# Patient Record
Sex: FEMALE | Race: ASIAN | NOT HISPANIC OR LATINO | Employment: STUDENT | URBAN - METROPOLITAN AREA
[De-identification: names, ages, dates, MRNs, and addresses within clinical notes are randomized per-mention and may not be internally consistent; named-entity substitution may affect disease eponyms.]

---

## 2020-03-17 ENCOUNTER — COMMUNICATION - HEALTHEAST (OUTPATIENT)
Dept: FAMILY MEDICINE | Facility: CLINIC | Age: 20
End: 2020-03-17

## 2020-08-14 ENCOUNTER — OFFICE VISIT - HEALTHEAST (OUTPATIENT)
Dept: FAMILY MEDICINE | Facility: CLINIC | Age: 20
End: 2020-08-14

## 2020-08-14 DIAGNOSIS — L70.0 ACNE VULGARIS: ICD-10-CM

## 2020-08-14 DIAGNOSIS — Z11.3 SCREEN FOR STD (SEXUALLY TRANSMITTED DISEASE): ICD-10-CM

## 2020-08-14 ASSESSMENT — MIFFLIN-ST. JEOR: SCORE: 1306.79

## 2020-08-18 LAB
C TRACH DNA SPEC QL PROBE+SIG AMP: NEGATIVE
N GONORRHOEA DNA SPEC QL NAA+PROBE: NEGATIVE

## 2020-08-25 ENCOUNTER — COMMUNICATION - HEALTHEAST (OUTPATIENT)
Dept: FAMILY MEDICINE | Facility: CLINIC | Age: 20
End: 2020-08-25

## 2020-09-08 ENCOUNTER — OFFICE VISIT - HEALTHEAST (OUTPATIENT)
Dept: FAMILY MEDICINE | Facility: CLINIC | Age: 20
End: 2020-09-08

## 2020-09-08 DIAGNOSIS — L70.0 ACNE VULGARIS: ICD-10-CM

## 2020-09-08 RX ORDER — CLINDAMYCIN PHOSPHATE 10 MG/G
GEL TOPICAL
Qty: 60 G | Refills: 0 | Status: SHIPPED | OUTPATIENT
Start: 2020-09-08 | End: 2023-10-17

## 2020-09-10 ENCOUNTER — COMMUNICATION - HEALTHEAST (OUTPATIENT)
Dept: FAMILY MEDICINE | Facility: CLINIC | Age: 20
End: 2020-09-10

## 2020-09-10 DIAGNOSIS — L70.0 ACNE VULGARIS: ICD-10-CM

## 2021-04-22 ENCOUNTER — COMMUNICATION - HEALTHEAST (OUTPATIENT)
Dept: FAMILY MEDICINE | Facility: CLINIC | Age: 21
End: 2021-04-22

## 2021-04-28 ENCOUNTER — COMMUNICATION - HEALTHEAST (OUTPATIENT)
Dept: FAMILY MEDICINE | Facility: CLINIC | Age: 21
End: 2021-04-28

## 2021-05-27 ENCOUNTER — OFFICE VISIT - HEALTHEAST (OUTPATIENT)
Dept: FAMILY MEDICINE | Facility: CLINIC | Age: 21
End: 2021-05-27

## 2021-05-27 DIAGNOSIS — L70.0 ACNE VULGARIS: ICD-10-CM

## 2021-05-27 DIAGNOSIS — Z11.3 SCREEN FOR STD (SEXUALLY TRANSMITTED DISEASE): ICD-10-CM

## 2021-05-27 DIAGNOSIS — Z12.4 SCREENING FOR MALIGNANT NEOPLASM OF CERVIX: ICD-10-CM

## 2021-05-27 LAB
CLUE CELLS: NORMAL
TRICHOMONAS, WET PREP: NORMAL
YEAST, WET PREP: NORMAL

## 2021-05-27 RX ORDER — TRETINOIN 0.5 MG/G
CREAM TOPICAL DAILY
Qty: 45 G | Refills: 3 | Status: SHIPPED | OUTPATIENT
Start: 2021-05-27 | End: 2023-10-17 | Stop reason: SINTOL

## 2021-05-27 RX ORDER — SPIRONOLACTONE 25 MG/1
25 TABLET ORAL DAILY
Qty: 90 TABLET | Refills: 3 | Status: SHIPPED | OUTPATIENT
Start: 2021-05-27 | End: 2023-10-17

## 2021-05-27 ASSESSMENT — MIFFLIN-ST. JEOR: SCORE: 1345.35

## 2021-06-04 VITALS
DIASTOLIC BLOOD PRESSURE: 76 MMHG | SYSTOLIC BLOOD PRESSURE: 114 MMHG | HEART RATE: 88 BPM | HEIGHT: 62 IN | WEIGHT: 131.5 LBS | BODY MASS INDEX: 24.2 KG/M2 | OXYGEN SATURATION: 98 % | TEMPERATURE: 99.6 F

## 2021-06-10 NOTE — PROGRESS NOTES
FEMALE PREVENTATIVE EXAM    Assessment and Plan:     Annual physical.  Patient has not been to a doctor in a while, one summer in [employer] but not sure where she was receiving care.  Immunizations are up-to-date, not due for Tdap until 2025.  She is due for Pap smear within the year, she says she will make the appointment.  Recommended she establish care with a dentist, has not seen one in quite some time. Never smoked, never drank, no other drugs.     Screen for STD (sexually transmitted disease)  Currently sexually active, has a Nexplanon in place, had a done at [facility] in April 2020.  Asymptomatic, okay with screen for STDs today  - Chlamydia trachomatis & Neisseria gonorrhoeae, Amplified Detection    Acne vulgaris  Acne on her face and on her back, started after having her Nexplanon placed.  Will treat with tretinoin topically and trial Spironolactone.  If she has any episodes of dizziness, cut dose in half.  Follow-up in 1 month to discuss effectiveness.  If she continues on this medication, will do labs to monitor liver and kidney function.  - tretinoin (RETIN-A) 0.05 % cream; Apply topically daily.  Dispense: 45 g; Refill: 0  - spironolactone (ALDACTONE) 25 MG tablet; Take 1 tablet (25 mg total) by mouth daily.  Dispense: 30 tablet; Refill: 0     Next follow up:  Return in about 3 weeks (around 9/4/2020) for Acne.    Immunization Review  Adult Imm Review: No immunizations due today    I discussed the following with the patient:   Adult Healthy Living: Importance of regular exercise  Getting adequate sleep  Stress management      Subjective:   Chief Complaint: Eric Jesus is an 19 y.o. female here for a preventative health visit.     HPI:   Healthy 19-year-old female here for a physical and to establish care.  She is up-to-date on her immunizations, not due for Tdap until 2025.  She is not yet 20, not due for Pap smear.  She is currently sexually active, has a Nexplanon in place since April 2020.  She  has never been tested for STDs, okay with screening today.  Deferred HIV testing.  She works in medical assembly.    Acne.  Patient has been struggling with acne since she had her Nexplanon placed.  We discussed options for her acne, including OCPs but she already has a Nexplanon in place and does not want to take daily birth control/hormone pill on top of her Nexplanon.  Other options are topical, including benzyl peroxide (she is using some over-the-counter), tretinoin.  She also has acne on her butt, has been causing her some distress.  Other option would be spironolactone, her blood pressure will tolerate it.      She takes no chronic medications  Past medical history.  No known chronic medical issues  Surgical history.  No history of surgeries  Family history.  No known family history of diabetes, hypertension, hyperlipidemia, stroke, heart attack        Healthy Habits  Are you taking a daily aspirin? No  Do you typically exercising at least 40 min, 3-4 times per week?  NO  Do you usually eat at least 4 servings of fruit and vegetables a day, include whole grains and fiber and avoid regularly eating high fat foods? NO  Have you had an eye exam in the past two years? NO  Do you see a dentist twice per year? NO  Do you have any concerns regarding sleep? No    Safety Screen  If you own firearms, are they secured in a locked gun cabinet or with trigger locks? The patient does not own any firearms  Do you feel you are safe where you are living?: Yes (8/14/2020  3:17 PM)  Do you feel you are safe in your relationship(s)?: Yes (8/14/2020  3:17 PM)      Review of Systems:  Please see above.  The rest of the review of systems are negative for all systems.     Pap History:   No - under age 21 PAP not appropriate for age  Cancer Screening     Patient has no health maintenance due at this time          Patient Care Team:  Provider, No Primary Care as PCP - General        History     Reviewed By Date/Time Sections Reviewed  "   Dianna Wu, LPN 8/14/2020  3:21 PM Tobacco, Alcohol, Drug Use, Sexual Activity            Objective:   Vital Signs:   Visit Vitals  /76 (Patient Site: Left Arm, Patient Position: Sitting, Cuff Size: Adult Regular)   Pulse 88   Temp 99.6  F (37.6  C) (Oral)   Ht 5' 1.5\" (1.562 m)   Wt 131 lb 8 oz (59.6 kg)   LMP  (LMP Unknown) Comment: No menses since implant inserted 4/2020   SpO2 98%   BMI 24.44 kg/m           PHYSICAL EXAM  General: Alert and oriented, in NAD.  Eyes: PERRL, EOMI, sclera normal.  HENT: Normocephalic, no pharyngeal erythema, MMM.  Neck: Supple, no adenopathy.  Heart: Normal S1 and S2, regular rhythm. No murmurs, gallops, rubs.  Lungs: CTA bilaterally, no wheezes, no crackles, no rhonchi.  Abdomen: Soft, non-tender, non-distended, BS+.   MSK: Normal ROM of upper and lower extremities.  Neuro: Alert and oriented x 3. Moves all extremities. No focal deficits noted.  Extremities: Peripheral pulses intact, no peripheral edema.  Skin: Warm and well perfused.  Pustules on her forehead, buttocks in various stages of healing.  Psych: Normal mood and affect.        Additional Screenings Completed Today:   See assessment and plan      ======================================================      Options for treatment and follow-up care were reviewed with the patient. Eric Jesus and/or guardian was engaged and actively involved in the decision making process. Eric Jesus and/or guardian verbalized understanding of the options discussed and was satisfied with the final plan.    Don Botello MD  "

## 2021-06-11 NOTE — PROGRESS NOTES
"Eric Jesus is a 19 y.o. female who is being evaluated via a billable telephone visit.      Diagnoses and all orders for this visit:    Acne vulgaris  No side effects on spironolactone, but not as much improvement on her face as desired. Will add clindagel, but follow up if improvement stalls.   -     clindamycin (CLINDAGEL) 1 % gel; Use on face nightly  -     spironolactone (ALDACTONE) 25 MG tablet; Take 1 tablet (25 mg total) by mouth daily.      Follow up 1 month if no improvement.   In office visit to check BP and labs before increasing spironolactone.     =============================================    The patient has been notified of following:     \"This telephone visit will be conducted via a call between you and your physician/provider. We have found that certain health care needs can be provided without the need for a physical exam.  This service lets us provide the care you need with a short phone conversation.  If a prescription is necessary we can send it directly to your pharmacy.  If lab work is needed we can place an order for that and you can then stop by our lab to have the test done at a later time.    Telephone visits are billed at different rates depending on your insurance coverage. During this emergency period, for some insurers they may be billed the same as an in-person visit.  Please reach out to your insurance provider with any questions.    If during the course of the call the physician/provider feels a telephone visit is not appropriate, you will not be charged for this service.\"    Patient has given verbal consent to a Telephone visit? Yes    Patient would like to receive their AVS by AVS Preference: Blairt.    Additional provider notes:     Following up on acne.   The acne on her body has significantly improved.   The acne on her face did slightly improve, but has reached a plateau.   Currently on spironolactone 25mg without side effects, tretinoin.   Discussed options for increasing " spironolactone, but she has no way of checking BP.       =================================  Phone call start time: 3:20PM  Phone call end time: 3:28PM  Phone call duration:  8 minutes    Don Botello MD

## 2021-06-11 NOTE — TELEPHONE ENCOUNTER
RN cannot approve Refill Request    RN can NOT refill this medication Protocol failed and NO refill given. Last office visit: Visit date not found Last Physical: 8/14/2020 Last MTM visit: Visit date not found Last visit same specialty: Visit date not found.  Next visit within 3 mo: Visit date not found  Next physical within 3 mo: Visit date not found      Angie Puentes, [facility] Triage/Med Refill 9/11/2020    Requested Prescriptions   Pending Prescriptions Disp Refills     spironolactone (ALDACTONE) 25 MG tablet [Pharmacy Med Name: SPIRONOLACTONE 25MG TABLETS] 30 tablet 0     Sig: TAKE 1 TABLET(25 MG) BY MOUTH DAILY       Diuretics/Combination Diuretics Refill Protocol  Failed - 9/10/2020  3:24 AM        Failed - Serum Potassium in past 12 months      No results found for: LN-POTASSIUM          Failed - Serum Sodium in past 12 months      No results found for: LN-SODIUM          Failed - Serum Creatinine in past 12 months      No results found for: CREATININE          Passed - Visit with PCP or prescribing provider visit in past 12 months     Last office visit with prescriber/PCP: Visit date not found OR same dept: Visit date not found OR same specialty: Visit date not found  Last physical: 8/14/2020 Last MTM visit: Visit date not found   Next visit within 3 mo: Visit date not found  Next physical within 3 mo: Visit date not found  Prescriber OR PCP: Don Botello MD  Last diagnosis associated with med order: 1. Acne vulgaris  - spironolactone (ALDACTONE) 25 MG tablet [Pharmacy Med Name: SPIRONOLACTONE 25MG TABLETS]; TAKE 1 TABLET(25 MG) BY MOUTH DAILY  Dispense: 30 tablet; Refill: 0    If protocol passes may refill for 12 months if within 3 months of last provider visit (or a total of 15 months).             Passed - Blood pressure on file in past 12 months     BP Readings from Last 1 Encounters:   08/14/20 114/76

## 2021-06-16 PROBLEM — L70.0 ACNE VULGARIS: Status: ACTIVE | Noted: 2020-09-08

## 2021-06-17 NOTE — TELEPHONE ENCOUNTER
Unable to reach patient. She is due for a pap smear. If she calls back please let her know she is due for a cervical cancer screening and assist with scheduling. Thanks

## 2021-06-25 NOTE — PROGRESS NOTES
"Eric Jesus is a 20 y.o. female here for pap only    ASSESSMENT/PLAN:   Eric was seen today for gynecologic exam.    Diagnoses and all orders for this visit:    Screening for malignant neoplasm of cervix  -     Gynecologic Cytology (PAP Smear)    Acne vulgaris  -     spironolactone (ALDACTONE) 25 MG tablet; Take 1 tablet (25 mg total) by mouth daily.  -     tretinoin (RETIN-A) 0.05 % cream; Apply topically daily.    Screen for STD (sexually transmitted disease)  -     Wet Prep, Vaginal          Return in about 1 year (around 5/27/2022) for Annual physical.       ======================================================    SUBJECTIVE  Eric Jesus is a 20 y.o. female here for pap, no other concerns.     Wants to continue on her spironolactone and tretinoin for acne, seeing significant improvement.   Is sexually active, has no concerns for stds.   Tested negative for chlamydia less than a year ago.   Has only had one partner.     ROS  Complete 10 point review of systems negative except as noted above in HPI    Reviewed Past Medical History, Medications, Family History and Social History in Epic and up to date with no new changes.    OBJECTIVE  /62   Pulse 77   Temp 98  F (36.7  C) (Oral)   Resp 16   Ht 5' 1.5\" (1.562 m)   Wt 140 lb (63.5 kg)   LMP  (LMP Unknown)   SpO2 99%   Breastfeeding No   BMI 26.02 kg/m       General: Cooperative, pleasant, in no acute distress  CV: RRR, normal S1/S2, no murmur, rubs, gallops  Resp: No respiratory distress. Clear to auscultation bilaterally. No wheezes, rales, rhonchi  Abd: Nontender, nondistended, bowel sounds present   (female): External genitalia is without lesions. Introitus is normal, vaginal walls pink and moist without lesions or evidence of trauma. No cervical motion tenderness. No adnexal masses. No cervical lesions or discharge  Ext: radial/pedal pulses +2 bilaterally  MSK: Normal muscle tone  Skin: warm, well perfused. No rashes  Psych: No suicidal or homicidal " ideations, no self-harm.  Normal affect.    LABS & IMAGES   Results for orders placed or performed in visit on 05/27/21   Wet Prep, Vaginal    Specimen: Genital   Result Value Ref Range    Yeast Result No yeast seen No yeast seen    Trichomonas No Trichomonas seen No Trichomonas seen    Clue Cells, Wet Prep No Clue cells seen No Clue cells seen   Gynecologic Cytology (PAP Smear)   Result Value Ref Range    Case Report       Gynecologic Cytology Report                       Case: [id number]                                   Authorizing Provider:  Don Botello MD         Collected:           05/27/2021 1541              Ordering Location:     [facility]   Received:            05/27/2021 1541                                     [place]                                                                     First Screen:          Michele Simpson, CT                                                                           (ASCP)                                                                       Specimen:    SUREPATH PAP, SCREENING, Endocervical/cervical                                             Interpretation  Negative for squamous intraepithelial lesion or malignancy.      Negative for squamous intraepithelial lesion or malignancy    Result Flag Normal Normal    Specimen Adequacy       Satisfactory for evaluation, endocervical/transformation zone component present  Lack of clinical history, LMP not given    HPV Reflex? Yes if Abnormal     HIGH RISK No     LMP/Menopause Date unsure     Abnormal Bleeding No     Pt Status n/a     Birth Control/Hormones None     Previous Normal/Date none     Prev Abn Date/Dx none     Cervical Appearance normal          ======================================================  Options for treatment and follow-up care were reviewed with the patient. Eric Jesus and/or guardian was engaged and actively involved in the decision making process. Eric Jesus and/or guardian  verbalized understanding of the options discussed and was satisfied with the final plan.      Don Botello MD

## 2021-06-27 ENCOUNTER — HEALTH MAINTENANCE LETTER (OUTPATIENT)
Age: 21
End: 2021-06-27

## 2021-07-06 VITALS
SYSTOLIC BLOOD PRESSURE: 110 MMHG | HEIGHT: 62 IN | BODY MASS INDEX: 25.76 KG/M2 | RESPIRATION RATE: 16 BRPM | TEMPERATURE: 98 F | HEART RATE: 77 BPM | WEIGHT: 140 LBS | DIASTOLIC BLOOD PRESSURE: 62 MMHG | OXYGEN SATURATION: 99 %

## 2021-10-17 ENCOUNTER — HEALTH MAINTENANCE LETTER (OUTPATIENT)
Age: 21
End: 2021-10-17

## 2022-10-02 ENCOUNTER — HEALTH MAINTENANCE LETTER (OUTPATIENT)
Age: 22
End: 2022-10-02

## 2023-02-11 ENCOUNTER — HEALTH MAINTENANCE LETTER (OUTPATIENT)
Age: 23
End: 2023-02-11

## 2023-10-16 ASSESSMENT — ENCOUNTER SYMPTOMS
FREQUENCY: 0
NAUSEA: 0
FEVER: 0
HEARTBURN: 0
EYE PAIN: 0
PALPITATIONS: 0
BREAST MASS: 0
JOINT SWELLING: 0
HEMATURIA: 0
DYSURIA: 0
PARESTHESIAS: 0
COUGH: 0
MYALGIAS: 0
DIZZINESS: 0
SORE THROAT: 0
NERVOUS/ANXIOUS: 0
CONSTIPATION: 0
ABDOMINAL PAIN: 0
SHORTNESS OF BREATH: 0
ARTHRALGIAS: 0
WEAKNESS: 0
HEMATOCHEZIA: 0
CHILLS: 0
HEADACHES: 0
DIARRHEA: 0

## 2023-10-17 ENCOUNTER — OFFICE VISIT (OUTPATIENT)
Dept: FAMILY MEDICINE | Facility: CLINIC | Age: 23
End: 2023-10-17
Payer: COMMERCIAL

## 2023-10-17 ENCOUNTER — ANCILLARY PROCEDURE (OUTPATIENT)
Dept: GENERAL RADIOLOGY | Facility: CLINIC | Age: 23
End: 2023-10-17
Attending: FAMILY MEDICINE
Payer: COMMERCIAL

## 2023-10-17 VITALS
HEIGHT: 62 IN | TEMPERATURE: 97.7 F | WEIGHT: 138.8 LBS | DIASTOLIC BLOOD PRESSURE: 62 MMHG | OXYGEN SATURATION: 99 % | RESPIRATION RATE: 12 BRPM | SYSTOLIC BLOOD PRESSURE: 100 MMHG | HEART RATE: 70 BPM | BODY MASS INDEX: 25.54 KG/M2

## 2023-10-17 DIAGNOSIS — Z13.220 SCREENING FOR LIPID DISORDERS: ICD-10-CM

## 2023-10-17 DIAGNOSIS — Z13.1 SCREENING FOR DIABETES MELLITUS: ICD-10-CM

## 2023-10-17 DIAGNOSIS — M25.532 LEFT WRIST PAIN: ICD-10-CM

## 2023-10-17 DIAGNOSIS — L70.0 ACNE VULGARIS: ICD-10-CM

## 2023-10-17 DIAGNOSIS — Z11.3 SCREENING FOR STDS (SEXUALLY TRANSMITTED DISEASES): ICD-10-CM

## 2023-10-17 DIAGNOSIS — B35.4 TINEA CORPORIS: ICD-10-CM

## 2023-10-17 DIAGNOSIS — Z23 ENCOUNTER FOR IMMUNIZATION: ICD-10-CM

## 2023-10-17 DIAGNOSIS — E66.3 OVERWEIGHT (BMI 25.0-29.9): ICD-10-CM

## 2023-10-17 DIAGNOSIS — Z00.00 ROUTINE GENERAL MEDICAL EXAMINATION AT A HEALTH CARE FACILITY: Primary | ICD-10-CM

## 2023-10-17 LAB
CHOLEST SERPL-MCNC: 154 MG/DL
FASTING STATUS PATIENT QL REPORTED: YES
GLUCOSE SERPL-MCNC: 105 MG/DL (ref 70–99)
HDLC SERPL-MCNC: 64 MG/DL
LDLC SERPL CALC-MCNC: 79 MG/DL
NONHDLC SERPL-MCNC: 90 MG/DL
TRIGL SERPL-MCNC: 57 MG/DL

## 2023-10-17 PROCEDURE — 36415 COLL VENOUS BLD VENIPUNCTURE: CPT | Performed by: FAMILY MEDICINE

## 2023-10-17 PROCEDURE — 82947 ASSAY GLUCOSE BLOOD QUANT: CPT | Performed by: FAMILY MEDICINE

## 2023-10-17 PROCEDURE — 99395 PREV VISIT EST AGE 18-39: CPT | Mod: 25 | Performed by: FAMILY MEDICINE

## 2023-10-17 PROCEDURE — 90480 ADMN SARSCOV2 VAC 1/ONLY CMP: CPT | Performed by: FAMILY MEDICINE

## 2023-10-17 PROCEDURE — 99214 OFFICE O/P EST MOD 30 MIN: CPT | Mod: 25 | Performed by: FAMILY MEDICINE

## 2023-10-17 PROCEDURE — 73110 X-RAY EXAM OF WRIST: CPT | Mod: TC | Performed by: RADIOLOGY

## 2023-10-17 PROCEDURE — 80061 LIPID PANEL: CPT | Performed by: FAMILY MEDICINE

## 2023-10-17 PROCEDURE — 90471 IMMUNIZATION ADMIN: CPT | Performed by: FAMILY MEDICINE

## 2023-10-17 PROCEDURE — 87591 N.GONORRHOEAE DNA AMP PROB: CPT | Performed by: FAMILY MEDICINE

## 2023-10-17 PROCEDURE — 87491 CHLMYD TRACH DNA AMP PROBE: CPT | Performed by: FAMILY MEDICINE

## 2023-10-17 PROCEDURE — 90686 IIV4 VACC NO PRSV 0.5 ML IM: CPT | Performed by: FAMILY MEDICINE

## 2023-10-17 PROCEDURE — 91320 SARSCV2 VAC 30MCG TRS-SUC IM: CPT | Performed by: FAMILY MEDICINE

## 2023-10-17 RX ORDER — TRETINOIN 0.025 %
CREAM (GRAM) TOPICAL AT BEDTIME
Qty: 60 G | Refills: 3 | Status: SHIPPED | OUTPATIENT
Start: 2023-10-17

## 2023-10-17 RX ORDER — CLOTRIMAZOLE 1 %
CREAM (GRAM) TOPICAL 2 TIMES DAILY
Qty: 45 G | Refills: 0 | Status: SHIPPED | OUTPATIENT
Start: 2023-10-17

## 2023-10-17 ASSESSMENT — ENCOUNTER SYMPTOMS
CHILLS: 0
NERVOUS/ANXIOUS: 0
NAUSEA: 0
HEADACHES: 0
ABDOMINAL PAIN: 0
HEMATOCHEZIA: 0
DYSURIA: 0
HEMATURIA: 0
PALPITATIONS: 0
DIARRHEA: 0
COUGH: 0
JOINT SWELLING: 0
FEVER: 0
EYE PAIN: 0
SORE THROAT: 0
BREAST MASS: 0
HEARTBURN: 0
FREQUENCY: 0
MYALGIAS: 0
CONSTIPATION: 0
PARESTHESIAS: 0
WEAKNESS: 0
ARTHRALGIAS: 0
SHORTNESS OF BREATH: 0
DIZZINESS: 0

## 2023-10-17 NOTE — PATIENT INSTRUCTIONS
"For acne - can buy adapalene (differin) over the counter. Start using retinoid cream every other day or every third day, apply at nighttime.  \"Non-comedogenic\" sunscreen or face lotions are okay    [facility]  Preventive Health Recommendations  Female Ages 21 to 25     Yearly exam:   See your health care provider every year in order to  Review health changes.   Discuss preventive care.    Review your medicines if your doctor has prescribed any.    You should be tested each year for STDs (sexually transmitted diseases).     Talk to your provider about how often you should have cholesterol testing.    Get a Pap test every three years. If you have an abnormal result, your doctor may have you test more often.    If you are at risk for diabetes, you should have a diabetes test (fasting glucose).     Shots:   Get a flu shot each year.   Get a tetanus shot every 10 years.   Consider getting the shot (vaccine) that prevents cervical cancer (Gardasil).    Nutrition:   Eat at least 5 servings of fruits and vegetables each day.  Eat whole-grain bread, whole-wheat pasta and brown rice instead of white grains and rice.  Get adequate Calcium and Vitamin D.     Lifestyle  Exercise at least 150 minutes a week each week (30 minutes a day, 5 days a week). This will help you control your weight and prevent disease.  Limit alcohol to one drink per day.  No smoking.   Wear sunscreen to prevent skin cancer.  See your dentist every six months for an exam and cleaning.  "

## 2023-10-17 NOTE — PROGRESS NOTES
SUBJECTIVE:   CC: Eric is an 22 year old who presents for preventive health visit.       10/17/2023     8:28 AM   Additional Questions   Roomed by coleen pierre       Healthy Habits:     Getting at least 3 servings of Calcium per day:  Yes    Bi-annual eye exam:  NO    Dental care twice a year:  NO    Sleep apnea or symptoms of sleep apnea:  None    Diet:  Regular (no restrictions)    Frequency of exercise:  2-3 days/week    Duration of exercise:  15-30 minutes    Taking medications regularly:  Yes    Medication side effects:  Not applicable    Additional concerns today:  Yes    In school for psychology, [employer], was thinking research psychologist, exploring options; finishes this semester    Lives with partner and a few of his siblings    Nexplanon 4/2020  - past two years didn't have period, then got one about 2 weeks ago    Wrist pain  - used to work in medical assembly, 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 (For example, a Health Directive, POLST, or a discussion with a medical provider or your loved ones about your wishes): No, advance care planning information given to patient to review.  Patient plans to discuss their wishes with loved ones or provider.      Social History     Tobacco Use    Smoking status: Never     Passive exposure: Never    Smokeless tobacco: Never    Tobacco comments:     no passive exposure   Substance Use Topics    Alcohol use: Never             10/16/2023     5:31 PM   Alcohol Use   Prescreen: >3 drinks/day or >7 drinks/week? Not  "Applicable     Reviewed orders with patient.  Reviewed health maintenance and updated orders accordingly - No      Breast Cancer Screening:  No family history of breast cancer      History of abnormal Pap smear: NO - age 21-29 PAP every 3 years recommended      Latest Ref Rng & Units 5/27/2021     3:41 PM   PAP / HPV   PAP Negative for squamous intraepithelial lesion or malignancy. Negative for squamous intraepithelial lesion or malignancy  Electronically signed by Michele Simpson CT (ASCP) on 5/28/2021 at  2:18 PM        Reviewed and updated as needed this visit by clinical staff   Tobacco  Allergies  Meds  Problems  Med Hx  Surg Hx  Fam Hx          Reviewed and updated as needed this visit by Provider   Tobacco  Allergies  Meds  Problems  Med Hx  Surg Hx  Fam Hx             Review of Systems   Constitutional:  Negative for chills and fever.   HENT:  Negative for congestion, ear pain, hearing loss and sore throat.    Eyes:  Negative for pain and visual disturbance.   Respiratory:  Negative for cough and shortness of breath.    Cardiovascular:  Negative for chest pain, palpitations and peripheral edema.   Gastrointestinal:  Negative for abdominal pain, constipation, diarrhea, heartburn, hematochezia and nausea.   Breasts:  Negative for tenderness, breast mass and discharge.   Genitourinary:  Negative for dysuria, frequency, genital sores, hematuria, pelvic pain, urgency, vaginal bleeding and vaginal discharge.   Musculoskeletal:  Negative for arthralgias, joint swelling and myalgias.   Skin:  Negative for rash.   Neurological:  Negative for dizziness, weakness, headaches and paresthesias.   Psychiatric/Behavioral:  Negative for mood changes. The patient is not nervous/anxious.           OBJECTIVE:   /62   Pulse 70   Temp 97.7  F (36.5  C) (Oral)   Resp 12   Ht 1.579 m (5' 2.17\")   Wt 63 kg (138 lb 12.8 oz)   LMP 09/24/2023 (Exact Date)   SpO2 99%   BMI 25.25 kg/m    Physical " Exam  GENERAL: healthy, alert and no distress  EYES: Eyes grossly normal to inspection, PERRL and conjunctivae and sclerae normal  HENT: ear canals and TM's normal, nose and mouth without ulcers or lesions  NECK: no adenopathy, no asymmetry, masses, or scars and thyroid normal to palpation  RESP: lungs clear to auscultation - no rales, rhonchi or wheezes  BREAST: normal without masses, tenderness or nipple discharge and no palpable axillary masses or adenopathy, on lateral left breast there is a mildly hyperpigmented patch with fine scale  CV: regular rate and rhythm, normal S1 S2, no S3 or S4, no murmur, click or rub, no peripheral edema and peripheral pulses strong  ABDOMEN: soft, nontender, no hepatosplenomegaly, no masses and bowel sounds normal  MS: no gross musculoskeletal defects noted, no edema  SKIN: scattered pink papules on bilateral cheeks, no scarring, no other suspicious lesions or rashes  NEURO: Normal strength and tone, mentation intact and speech normal  PSYCH: mentation appears normal, affect normal/bright        ASSESSMENT/PLAN:   (Z00.00) Routine general medical examination at a health care facility  (primary encounter diagnosis)  Comment: Routine physical exam today with several acute health concerns.  Plan: Labs, screenings, and vaccines as ordered and counseling as detailed below.    (L70.0) Acne vulgaris  Comment: Mild, limited to face. Had dryness with higher strength retinoid and clindamycin gel previously. Will trial new regimen. Advised to use retin-A every other night or every third night and build up to decrease irritation. Also if unable to get retin-A through prescription, can buy adapalene over the counter.  Plan: tretinoin (RETIN-A) 0.025 % external cream,         benzoyl peroxide 5 % external liquid            (B35.4) Tinea corporis  Comment: Left breast discoloration is due to tinea corporis. Otherwise breast exam is normal without any masses, other skin changes, or nipple  ciera.  Plan: clotrimazole (LOTRIMIN) 1 % external cream        If no improvement with clotrimazole, return to care for re-evaluation.    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 Differential includes ECU tendinopathy.  Plan: XR Wrist Left G/E 3 Views, Occupational Therapy        Referral  - xray today  - NSAIDs prn pain  - use immobilizing wrist splint when doing activities that require lots of bending or rotating wrist  - start occupational therapy  - can consider referral to orthopedics if above does not help    (Z68.25) BMI 25.0-25.9,adult  Comment: Given BMI and race, recommend screening for lipid disorders and diabetes  Plan: Lipid panel reflex to direct LDL Non-fasting,         Glucose    (E66.3) Overweight (BMI 25.0-29.9)          (Z13.220) Screening for lipid disorders  Comment: see above  Plan: Lipid panel reflex to direct LDL Non-fasting            (Z13.1) Screening for diabetes mellitus  Comment: see above  Plan: Glucose            (Z11.3) Screening for STDs (sexually transmitted diseases)  Comment: Asymptomatic, Agrees to screening.  Plan: NEISSERIA GONORRHOEA PCR, CHLAMYDIA TRACHOMATIS        PCR            (Z23) Encounter for immunization  Comment:   Plan: INFLUENZA VACCINE IM > 6 MONTHS VALENT IIV4         (AFLURIA/FLUZONE), COVID-19 12+ ()         (PFIZER)            Patient has been advised of split billing requirements and indicates understanding: Yes      COUNSELING:  Reviewed preventive health counseling, as reflected in patient instructions  Special attention given to:        Regular exercise       Healthy diet/nutrition       Alcohol Use       Contraception       Family planning       Safe sex practices/STD prevention       Consider Hep C screening for all patients one time for ages 18-79 years       HIV screeninx in teen years, 1x in adult years, and at intervals if high risk        She reports that  "she has never smoked. She has never been exposed to tobacco smoke. She has never used smokeless tobacco.          Evette Garcia MD  [facility]      ADDENDUM: Diagnosis \"Overweight (BMI 25-29.9)\" added per  request for clarification.  "

## 2023-10-18 LAB
C TRACH DNA SPEC QL NAA+PROBE: NEGATIVE
N GONORRHOEA DNA SPEC QL NAA+PROBE: NEGATIVE

## 2023-10-24 DIAGNOSIS — R73.03 PREDIABETES: Primary | ICD-10-CM

## 2023-10-31 DIAGNOSIS — R73.03 PREDIABETES: Primary | ICD-10-CM

## 2023-11-29 NOTE — PROGRESS NOTES
OCCUPATIONAL THERAPY EVALUATION  Type of Visit: Evaluation    See electronic medical record for Abuse and Falls Screening details.    Subjective      Presenting condition or subjective complaint: Left wrist pain  Date of onset: 10/17/23 (Referral)    Relevant medical history:     Dates & types of surgery:      Patient comes to therapy today for formal evaluation of left, ulnar-sided wrist pain.  Until several months ago she worked in [employer], where on a repetitive basis she would lift and manipulate heavy boxes onto a table.  While working there developed this pain that is worsened with axial loading of the wrist, otherwise pain-free.  She is no longer working, has noticed slight improvement in pain since then.  Has trialed a wrist brace at daytime with minimal improvement.    Prior diagnostic imaging/testing results: X-ray     Prior therapy history for the same diagnosis, illness or injury: No      Prior Level of Function  Transfers: Independent  Ambulation: Independent  ADL: Independent  IADL: Driving, Finances, Housekeeping, Laundry, Meal preparation, Medication management, Work, Yard work    Living Environment  Social support: With a significant other or spouse   Type of home: House   Stairs to enter the home: Yes 1 Is there a railing: No   Ramp: No   Stairs inside the home: Yes 10 Is there a railing: Yes   Help at home:    Equipment owned:       Employment: No Currently unemployed.  Hobbies/Interests: [employer].    Patient goals for therapy: Move my wrist fully without pain.       Objective   ADDITIONAL HISTORY:  Right hand dominant  Patient reports symptoms of pain, stiffness/loss of motion, weakness/loss of strength, and edema  Transportation: drives  Currently not working.     Functional Outcome Measure:   Upper Extremity Functional Index Score:  SCORE:   Column Totals: /80: 72   (A lower score indicates greater disability.)    PAIN:  Pain Level at Rest: 0/10  Pain Level with Use:  "5/10  Pain Location: Ulnar wrist, TFCC, ulnar styloid  Pain Quality: Aching  Pain Frequency: intermittent  Pain is Worst: daytime  Pain is Exacerbated By: \"certain wrist movements,\" axial wrist loading  Pain is Relieved By: rest and repositioning, \"time\"  Pain Progression: Unchanged    POSTURE: Normal     EDEMA:  None appreciable to palpation about the bilateral wrists, ulnar styloid and TFCC.       SENSATION: WNL throughout all nerve distributions; per patient report     ROM:   Wrist ROM  Left AROM Right AROM    Extension 64 64   Flexion 68 64   Radial Deviation (RD) 14, + 8   Ulnar Deviation (UD) 17, ++ 28   Supination WNL WNL   Pronation WNL WNL     Ulnar Dorsal Zone Left   Radiocarpal P/S (TFCC--stab f/a, rotate with some compression) + (mild)   Ballottement II P/S (TFCC--stab hand/wrist, pt p/s) Mild discomfort with pronation, negative supination    TFCC load Test (UD, axially load wrist c volar/dorsal mvmt) Mild with pronation, negative with supination    UMTDG test (TFCC--approximate the pisotriquetral and ulna) + (mild)   Piano Key Sign (DRUJ) - (not present)   Piano Key Test (DRUJ--distal ulna moved in pro/sup) - (not present)     RESISTED TESTING: Resisted Testing (pain report)   Left   Wrist Ext with RD, Elbow at side Trace   Wrist Ext with UD, Elbow at side - (not present)   Wrist Flex with RD, Elbow at side - (not present)   Wrist Flex with UD, Elbow at side - (not present)      STRENGTH:     Measured in pounds 11/30/2023 11/30/2023    Left Right   Neutral, elbow flexed 56# 57#   Elbow flexed, FA pronated 38#, ++ 56#   Elbow flexed, FA supinated 59# 65#   Average       PALPATION:  Non-tender to palpation about left ECU belly and tendon, ulnar styloid, TFCC    Assessment & Plan   CLINICAL IMPRESSIONS  Medical Diagnosis: Left wrist pain    Treatment Diagnosis: Left wrist pain    Impression/Assessment: Pt is a 23 year old female presenting to Occupational Therapy due to left wrist pain.  The following " significant findings have been identified: Impaired activity tolerance, Impaired coordination, Impaired ROM, Impaired strength, and Pain.  These identified deficits interfere with their ability to perform self care tasks, work tasks, recreational activities, household chores, driving , household mobility, medication management, financial management,  yard work, and meal planning and preparation as compared to previous level of function.   Patient's limitations or Problem List includes: Pain, Decreased ROM/motion, Weakness, Decreased stability, Hypermobility, Hypomobility, Decreased , Decreased pinch, Decreased coordination, Decreased dexterity, and Tightness in musculature of the left wrist, hand, ring finger, and small finger which interferes with the patient's ability to perform Self Care Tasks (dressing), Work Tasks, Sleep Patterns, Recreational Activities, Household Chores, and Driving  as compared to previous level of function.    Clinical Decision Making (Complexity):  Assessment of Occupational Performance: 1-3 Performance Deficits  Occupational Performance Limitations: dressing, communication management, driving and community mobility, health management and maintenance, home establishment and management, meal preparation and cleanup, shopping, sleep, work, leisure activities, and social participation  Clinical Decision Making (Complexity): Low complexity    PLAN OF CARE  Treatment Interventions:  Modalities:  US  Therapeutic Exercise:  AROM, AAROM, PROM, Tendon Gliding, Blocking, Reverse Blocking, Place and Hold, Contract Relax, Extensor Tracking, Isotonics, Isometrics, and Stabilization  Neuromuscular re-education:  Nerve Gliding, Coordination/Dexterity, Sensory re-education, Desensitization, Kinesthetic Training, Proprioceptive Training, Posture, Kinesiotaping, Strain Counter Strain, Isometrics, and Stabilization  Manual Techniques:  Coordination/Dexterity, Joint mobilization, Friction massage,  Myofascial release, and Manual edema mobilization  Orthotic Fabrication:  Static, Forearm based, and Long arm  Self Care:  Self Care Tasks, Ergonomic Considerations, and Work Tasks    Long Term Goals   OT Goal 1  Goal Identifier: Goal I  Goal Description: Patient will exhibit 60# or more of LUE  strength in FA pronation for improved activity tolerance with ADL and iADL tasks.  Rationale: In order to maximize safety and independence with performance of self-care activities  Goal Progress: New  Target Date: 01/25/24      Frequency of Treatment: 1x/week  Duration of Treatment: 8 weeks     Education Assessment: Learner/Method: Patient;No Barriers to Learning;Pictures/Video;Demonstration;Reading;Listening     Risks and benefits of evaluation/treatment have been explained.   Patient/Family/caregiver agrees with Plan of Care.     Evaluation Time:    OT Eval, Low Complexity Minutes (60659): 17    Signing Clinician: SAAD Wasserman [facility]                                                                                   OUTPATIENT OCCUPATIONAL THERAPY      PLAN OF TREATMENT FOR OUTPATIENT REHABILITATION   Patient's Last Name, First Name, DONALD JesusEric    YOB: 2000   Provider's Name   [facility]   Medical Record No.  [id number]     Onset Date: 10/17/23 (Referral) Start of Care Date: 11/30/23     Medical Diagnosis:  Left wrist pain      OT Treatment Diagnosis:  Left wrist pain Plan of Treatment  Frequency/Duration:1x/week/8 weeks    Certification date from 11/30/23   To 01/25/24        See note for plan of treatment details and functional goals     Arron Castillo OT                         I CERTIFY THE NEED FOR THESE SERVICES FURNISHED UNDER        THIS PLAN OF TREATMENT AND WHILE UNDER MY CARE     (Physician attestation of this document indicates review and certification of the therapy plan).              Referring  Provider:  Evette Mercado-Carol    Initial Assessment  See Epic Evaluation- 11/30/23

## 2023-11-30 ENCOUNTER — THERAPY VISIT (OUTPATIENT)
Dept: OCCUPATIONAL THERAPY | Facility: CLINIC | Age: 23
End: 2023-11-30
Attending: FAMILY MEDICINE
Payer: COMMERCIAL

## 2023-11-30 DIAGNOSIS — M25.532 LEFT WRIST PAIN: ICD-10-CM

## 2023-11-30 PROCEDURE — 97530 THERAPEUTIC ACTIVITIES: CPT | Mod: GO | Performed by: OCCUPATIONAL THERAPIST

## 2023-11-30 PROCEDURE — 97112 NEUROMUSCULAR REEDUCATION: CPT | Mod: 59 | Performed by: OCCUPATIONAL THERAPIST

## 2023-11-30 PROCEDURE — 97110 THERAPEUTIC EXERCISES: CPT | Mod: 59 | Performed by: OCCUPATIONAL THERAPIST

## 2023-11-30 PROCEDURE — 97165 OT EVAL LOW COMPLEX 30 MIN: CPT | Mod: GO | Performed by: OCCUPATIONAL THERAPIST

## 2023-12-18 ENCOUNTER — THERAPY VISIT (OUTPATIENT)
Dept: OCCUPATIONAL THERAPY | Facility: REHABILITATION | Age: 23
End: 2023-12-18
Payer: COMMERCIAL

## 2023-12-18 DIAGNOSIS — M25.532 LEFT WRIST PAIN: Primary | ICD-10-CM

## 2023-12-18 PROCEDURE — 97110 THERAPEUTIC EXERCISES: CPT | Mod: GO | Performed by: OCCUPATIONAL THERAPIST

## 2024-01-15 ENCOUNTER — THERAPY VISIT (OUTPATIENT)
Dept: OCCUPATIONAL THERAPY | Facility: REHABILITATION | Age: 24
End: 2024-01-15
Payer: COMMERCIAL

## 2024-01-15 DIAGNOSIS — M25.532 LEFT WRIST PAIN: Primary | ICD-10-CM

## 2024-01-15 PROCEDURE — 97760 ORTHOTIC MGMT&TRAING 1ST ENC: CPT | Mod: GO | Performed by: OCCUPATIONAL THERAPIST

## 2024-01-15 PROCEDURE — 97110 THERAPEUTIC EXERCISES: CPT | Mod: GO | Performed by: OCCUPATIONAL THERAPIST

## 2024-01-31 ENCOUNTER — LAB (OUTPATIENT)
Dept: LAB | Facility: CLINIC | Age: 24
End: 2024-01-31
Payer: COMMERCIAL

## 2024-01-31 DIAGNOSIS — R73.03 PREDIABETES: ICD-10-CM

## 2024-01-31 LAB — HBA1C MFR BLD: 5.9 % (ref 0–5.6)

## 2024-01-31 PROCEDURE — 36415 COLL VENOUS BLD VENIPUNCTURE: CPT

## 2024-01-31 PROCEDURE — 83036 HEMOGLOBIN GLYCOSYLATED A1C: CPT

## 2024-02-05 ENCOUNTER — THERAPY VISIT (OUTPATIENT)
Dept: OCCUPATIONAL THERAPY | Facility: REHABILITATION | Age: 24
End: 2024-02-05
Payer: COMMERCIAL

## 2024-02-05 DIAGNOSIS — M25.532 LEFT WRIST PAIN: Primary | ICD-10-CM

## 2024-02-05 PROCEDURE — 97535 SELF CARE MNGMENT TRAINING: CPT | Mod: GO | Performed by: OCCUPATIONAL THERAPIST

## 2024-02-05 PROCEDURE — 97110 THERAPEUTIC EXERCISES: CPT | Mod: GO | Performed by: OCCUPATIONAL THERAPIST

## 2024-02-05 NOTE — PROGRESS NOTES
02/05/24 0500   Appointment Info   Treating Provider TOYA Wasserman, OTR/L   Total/Authorized Visits 8 (POC)   Visits Used 4   Medical Diagnosis Left wrist pain   OT Tx Diagnosis Left wrist pain   Other pertinent information Cert needed   Quick Add  Certification   Progress Note/Certification   Start Of Care Date 11/30/23   Onset of Illness/Injury or Date of Surgery 10/17/23  (Referral)   Therapy Frequency 1x/week, every other week.   Predicted Duration Additional 8 weeks   Certification date from 02/05/24   Certification date to 04/01/24   Progress Note Due Date 04/01/24   Progress Note Completed Date 02/05/24   Goals   OT Goals 1   OT Goal 1   Goal Identifier Goal I   Goal Description Patient will exhibit 60# or more of LUE  strength in FA pronation for improved activity tolerance with ADL and iADL tasks.   Rationale In order to maximize safety and independence with performance of self-care activities   Goal Progress Progressing steadily, 45# today. Goal extended.   Target Date 04/01/24   Subjective Report   Subjective Report I didn't like the brace, and it doesn't hurt much today, though.   Objective Measures   Objective Measures Objective Measure 2;Objective Measure 1;Objective Measure 3   Objective Measure 1   Objective Measure 0/10   Details Pain at rest   Objective Measure 2   Objective Measure 3-4/10   Details Pain with activity   Objective Measure 3   Objective Measure 58# in neutral, 35# + in pronation, 62# in supination, 45#, + in pronation   Details LUE  strength   Treatment Interventions (OT)   Interventions Therapeutic Procedure/Exercise;Therapeutic Activity;Orthotics;Self Care/Home Management   Self Care/Home Management   Self Care 1 Expanded education regarding involved anatomy given differential, as well as anticipated course of recovery, purpose of strengthening. Expanded education regarding reasonable activity modfiication recommendations, including lifting in FA supination to  offload the TFCC.   Skilled Intervention For soft tissue protection and wrist stabilization, reducing pain with ADL, iADL.   Patient Response/Progress Tolerated well, patient demonstrated and verbalized understanding.   Therapeutic Procedure/Exercise   Therapeutic Procedures Ther Proc 2;Ther Proc 3   Ther Proc 1 Objective measurements and provocative testing to facilitate differential assessment and establish growth towards goals.   Ther Proc 2 HEP reviewed with clarity provided regarding strengthening vs. stabilization exercise, purpose of HEP and appropriate timing/implementation of intensity upgrades.   PTRx Ther Proc 1 Wrist Strengthening Isometric Flexion   PTRx Ther Proc 1 - Details HEP   PTRx Ther Proc 2 Wrist Stabilization Door Frame Pull Backs   PTRx Ther Proc 2 - Details HEP   PTRx Ther Proc 3 Wrist Stabilization Wall Push Ups on Fingertips   PTRx Ther Proc 3 - Details HEP   PTRx Ther Proc 4 Wrist Proprioception: Oscillation with Water in a Cup (Neutral)   PTRx Ther Proc 4 - Details HEP   PTRx Ther Proc 5 Wrist Stabilization Ball on Wall   PTRx Ther Proc 5 - Details HEP   PTRx Ther Proc 6 Hand Strengthening Gripping   PTRx Ther Proc 6 - Details HEP   Skilled Intervention For wrist stabilization, reducing pain and improving activity tolerance with ADL and IADL.   Patient Response/Progress Tolerated well, patient demonstrated and verbalized understanding.   Ther Proc 3 Tennis ball unilateral throws for wrist proprioception   Ther Proc 3 - Details HEP   Education   Learner/Method Patient;No Barriers to Learning;Pictures/Video;Demonstration;Reading;Listening   Plan   Home program Per PTRX, implement tissue protection strategies for the TFCC where able during the day. Wrist Widget on PRN at daytime.   Updates to plan of care 1x/week, every other week for additional 8 weeks.         [facility]                                                                                    OUTPATIENT OCCUPATIONAL THERAPY    PLAN OF TREATMENT FOR OUTPATIENT REHABILITATION   Patient's Last Name, First Name, Eric Brooks    YOB: 2000   Provider's Name   [facility]   Medical Record No.  [id number]     Onset Date: 10/17/23 (Referral) Start of Care Date: 11/30/23     Medical Diagnosis:  Left wrist pain      OT Treatment Diagnosis:  Left wrist pain Plan of Treatment  Frequency/Duration:1x/week, every other week./Additional 8 weeks    Certification date from 02/05/24   To 04/01/24        See note for plan of treatment details and functional goals     Arron Castillo OT                         I CERTIFY THE NEED FOR THESE SERVICES FURNISHED UNDER        THIS PLAN OF TREATMENT AND WHILE UNDER MY CARE     (Physician attestation of this document indicates review and certification of the therapy plan).              Referring Provider:  Evette Mercado-Carol    Initial Assessment  See Epic Evaluation- 11/30/23

## 2024-03-06 ENCOUNTER — THERAPY VISIT (OUTPATIENT)
Dept: OCCUPATIONAL THERAPY | Facility: REHABILITATION | Age: 24
End: 2024-03-06
Payer: COMMERCIAL

## 2024-03-06 DIAGNOSIS — M25.532 LEFT WRIST PAIN: Primary | ICD-10-CM

## 2024-03-06 PROCEDURE — 97110 THERAPEUTIC EXERCISES: CPT | Mod: GO | Performed by: OCCUPATIONAL THERAPIST

## 2024-03-06 PROCEDURE — 97535 SELF CARE MNGMENT TRAINING: CPT | Mod: GO | Performed by: OCCUPATIONAL THERAPIST

## 2024-03-06 NOTE — PROGRESS NOTES
03/06/24 0500   Appointment Info   Treating Provider TOYA Wasserman, OTR/L   Total/Authorized Visits 8 (POC)   Visits Used 5   Medical Diagnosis Left wrist pain   OT Tx Diagnosis Left wrist pain   Other pertinent information Cert needed   Quick Add  Certification   Progress Note/Certification   Start Of Care Date 11/30/23   Onset of Illness/Injury or Date of Surgery 10/17/23  (Referral)   Therapy Frequency 1x/week, every other week.   Predicted Duration Additional 8 weeks   Certification date from 02/05/24   Certification date to 04/01/24   Progress Note Due Date 04/01/24   Progress Note Completed Date 02/05/24   Goals   OT Goals 1   OT Goal 1   Goal Identifier Goal I   Goal Description Patient will exhibit 60# or more of LUE  strength in FA pronation for improved activity tolerance with ADL and iADL tasks.   Rationale In order to maximize safety and independence with performance of self-care activities   Goal Progress Progressing steadily, 50# today and asymptomatic. Appropriate for discharge.   Target Date 04/01/24   Subjective Report   Subjective Report I feel like I've gotten a lot better. First thing in the morning in still a little stiffer.   Objective Measures   Objective Measures Objective Measure 2;Objective Measure 1;Objective Measure 3   Objective Measure 1   Objective Measure 0/10   Details Pain at rest   Objective Measure 2   Objective Measure 2-3/10   Details Pain with activity   Objective Measure 3   Objective Measure 42# in pronation, +. 50# in neutral   Details LUE  strength   Treatment Interventions (OT)   Interventions Therapeutic Procedure/Exercise;Therapeutic Activity;Orthotics;Self Care/Home Management   Self Care/Home Management   Self-Care/Home Mgmt/ADL, Compensatory, Meal Prep Minutes (95924) 10 Minutes   Self Care 1 Expanded education regarding anticipated course of recovery with respect to soft tissue irritation of the ECU, TFCC.  Subsequent clarification regarding  expectations for ongoing positional awareness, activity modification and HEP progression to pronated  strengthening.   Skilled Intervention For soft tissue protection and wrist stabilization, reducing pain with ADL, iADL.   Patient Response/Progress Tolerated well, patient demonstrated and verbalized understanding.   Therapeutic Procedure/Exercise   Therapeutic Procedure: strength, endurance, ROM, flexibillity minutes (08244) 15   Therapeutic Procedures Ther Proc 2;Ther Proc 3   Ther Proc 1 Objective measurements and provocative testing to facilitate differential assessment and establish growth towards goals.   Ther Proc 3 Tennis ball unilateral throws for wrist proprioception   Ther Proc 3 - Details HEP   PTRx Ther Proc 1 Wrist Strengthening Isometric Flexion   PTRx Ther Proc 1 - Details HEP   PTRx Ther Proc 2 Wrist Stabilization Door Frame Pull Backs   PTRx Ther Proc 2 - Details HEP   PTRx Ther Proc 3 Wrist Stabilization Wall Push Ups on Fingertips   PTRx Ther Proc 3 - Details HEP   PTRx Ther Proc 4 Wrist Proprioception: Oscillation with Water in a Cup (Neutral)   PTRx Ther Proc 4 - Details HEP   PTRx Ther Proc 5 Wrist Stabilization Ball on Wall   PTRx Ther Proc 5 - Details HEP   PTRx Ther Proc 6 Hand Strengthening Gripping   PTRx Ther Proc 6 - Details HEP, new. 1 x 5 minutes in-clinic with education and assessment of tolerance.   Skilled Intervention For wrist stabilization, reducing pain and improving activity tolerance with ADL and IADL.   Patient Response/Progress Tolerated well, patient demonstrated and verbalized understanding.   PTRx Ther Proc 7 Shoulder Theraband External Rotation Step Out   PTRx Ther Proc 7 - Details HEP, new. 2 x 8 in-clinic with education and assessment of tolerance.   PTRx Ther Proc 8 Shoulder Theraband IR Step Out   PTRx Ther Proc 8 - Details HEP, new. 2 x 8 in-clinic with education and assessment of tolerance.   Education   Learner/Method Patient;No Barriers to  Learning;Pictures/Video;Demonstration;Reading;Listening   Plan   Home program Per PTRX, implement tissue protection strategies for the TFCC where able during the day. Wrist Widget on PRN at daytime.   Updates to plan of care DC         DISCHARGE  Reason for Discharge: Patient chooses to discontinue therapy.    Discharge Plan: Patient to continue home program.    Referring Provider:  Evette Mercado-*

## 2024-04-15 ENCOUNTER — LAB (OUTPATIENT)
Dept: LAB | Facility: CLINIC | Age: 24
End: 2024-04-15
Payer: COMMERCIAL

## 2024-04-15 DIAGNOSIS — R73.03 PREDIABETES: ICD-10-CM

## 2024-04-15 LAB — HBA1C MFR BLD: 5.4 % (ref 0–5.6)

## 2024-04-15 PROCEDURE — 36415 COLL VENOUS BLD VENIPUNCTURE: CPT

## 2024-04-15 PROCEDURE — 83036 HEMOGLOBIN GLYCOSYLATED A1C: CPT

## 2024-09-17 ENCOUNTER — PATIENT OUTREACH (OUTPATIENT)
Dept: CARE COORDINATION | Facility: CLINIC | Age: 24
End: 2024-09-17
Payer: COMMERCIAL

## 2024-10-01 ENCOUNTER — PATIENT OUTREACH (OUTPATIENT)
Dept: CARE COORDINATION | Facility: CLINIC | Age: 24
End: 2024-10-01
Payer: COMMERCIAL

## 2024-10-01 ENCOUNTER — TELEPHONE (OUTPATIENT)
Dept: FAMILY MEDICINE | Facility: CLINIC | Age: 24
End: 2024-10-01
Payer: COMMERCIAL

## 2024-10-01 NOTE — TELEPHONE ENCOUNTER
Patient Quality Outreach    Patient is due for the following:   Cervical Cancer Screening - PAP Needed  Physical Preventive Adult Physical    Next Steps:   Schedule a Adult Preventative    Type of outreach:    Sent [employer] message.    Next Steps:  Reach out within 90 days via Phone.    Max number of attempts reached: No. Will try again in 90 days if patient still on fail list.    Questions for provider review:    None           Agueda Hebert MA  Chart routed to Care Team.

## 2024-12-14 ENCOUNTER — HEALTH MAINTENANCE LETTER (OUTPATIENT)
Age: 24
End: 2024-12-14

## 2025-06-09 NOTE — TELEPHONE ENCOUNTER
Last Written Prescription:  amLODIPine (NORVASC) 5 MG tablet 30 tablet 0 1/30/2025     ----------------------  Last Visit Date: 5/15/25  Future Visit Date: 6/13/25  ----------------------    Refill decision: Medication unable to be refilled by RN due to:  Abnormal labs/test:  Component      Latest Ref Rng 5/15/2025  10:44 AM   Sodium      135 - 145 mmol/L 137    Potassium      3.4 - 5.3 mmol/L 4.6    Chloride      98 - 107 mmol/L 102    Carbon Dioxide (CO2)      22 - 29 mmol/L 24    Anion Gap      7 - 15 mmol/L 11    Urea Nitrogen      8.0 - 23.0 mg/dL 31.6 (H)    Creatinine      0.51 - 0.95 mg/dL 0.92    GFR Estimate      >60 mL/min/1.73m2 57 (L)    Calcium      8.8 - 10.4 mg/dL 9.9    Glucose      70 - 99 mg/dL 117 (H)        Request from pharmacy:  Requested Prescriptions   Pending Prescriptions Disp Refills    amLODIPine (NORVASC) 5 MG tablet 30 tablet 0     Sig: Take 1 tablet (5 mg) by mouth daily.       Calcium Channel Blockers Protocol  Failed - 6/9/2025  9:25 AM        Failed - GFR is on file in the past 12 months and most recent GFR is normal        Passed - Most recent blood pressure under 140/90 in past 12 months     BP Readings from Last 3 Encounters:   05/15/25 118/62   05/01/25 132/72   04/24/25 134/71       No data recorded            Passed - Medication is active on med list and the sig matches. RN to manually verify dose and sig if red X/fail.     If the protocol passes (green check), you do not need to verify med dose and sig.    A prescription matches if they are the same clinical intention.    For Example: once daily and every morning are the same.    The protocol can not identify upper and lower case letters as matching and will fail.     For Example: Take 1 tablet (50 mg) by mouth daily     TAKE 1 TABLET (50 MG) BY MOUTH DAILY    For all fails (red x), verify dose and sig.    If the refill does match what is on file, the RN can still proceed to approve the refill request.       If they do not  Via  line, [id number], reaching out to patient to inform her of cancellation of her appt on 3/20 to help prevent the spread of Covid-19. There was no answer, number is unavailable.    match, route to the appropriate provider.             Passed - Medication is indicated for associated diagnosis        Passed - Recent (12 month) or future (90 days) visit with authorizing provider's specialty (provided they have been seen in the past 15 months)     The patient must have completed an in-person or virtual visit within the past 12 months or has a future visit scheduled within the next 90 days with the authorizing provider s specialty.  Urgent care and e-visits do not qualify as an office visit for this protocol.          Passed - Patient is age 18 or older        Passed - No active pregnancy on record        Passed - No positive pregnancy test in past 12 months